# Patient Record
Sex: FEMALE | Race: WHITE | NOT HISPANIC OR LATINO | Employment: FULL TIME
[De-identification: names, ages, dates, MRNs, and addresses within clinical notes are randomized per-mention and may not be internally consistent; named-entity substitution may affect disease eponyms.]

---

## 2017-07-08 ENCOUNTER — HEALTH MAINTENANCE LETTER (OUTPATIENT)
Age: 37
End: 2017-07-08

## 2019-11-01 ENCOUNTER — OFFICE VISIT (OUTPATIENT)
Dept: URGENT CARE | Facility: URGENT CARE | Age: 39
End: 2019-11-01
Payer: COMMERCIAL

## 2019-11-01 VITALS
TEMPERATURE: 98.5 F | DIASTOLIC BLOOD PRESSURE: 76 MMHG | RESPIRATION RATE: 18 BRPM | SYSTOLIC BLOOD PRESSURE: 132 MMHG | OXYGEN SATURATION: 99 %

## 2019-11-01 DIAGNOSIS — M54.50 ACUTE LEFT-SIDED LOW BACK PAIN WITHOUT SCIATICA: Primary | ICD-10-CM

## 2019-11-01 PROCEDURE — 99203 OFFICE O/P NEW LOW 30 MIN: CPT | Performed by: PHYSICIAN ASSISTANT

## 2019-11-01 RX ORDER — CYCLOBENZAPRINE HCL 5 MG
5 TABLET ORAL 3 TIMES DAILY PRN
Qty: 21 TABLET | Refills: 0 | Status: SHIPPED | OUTPATIENT
Start: 2019-11-01

## 2019-11-02 NOTE — PROGRESS NOTES
"HPI:  Blaire is a 39 yo female who presents for left lower back pain x 2 days.  She has been involved in a weight lifting program x 7 weeks.  She first felt a twinge and back stiffness after a workout.  It seemed to be okay but then last night she bent down to pick something up and felt a \"pull\" and pain.  She used a heating pad over night and it feels better than yesterday but she sat all day at work today which seemed to aggravate it more.  Reports radiates into left leg sometimes to approx mid thigh, but denies numbness or tingling.  She has taken 2 advil for relief.       ROS:  See HPI      PE:  Vitals & nursing notes reviewed. B/P: 132/76, T: 98.5, P: Data Unavailable, R: 18  Constitutional:  Alert, well nourished, well-developed, NAD  MS:  TTP over left paraspinal muscles of lower back.  Reduced ROM on forward flexion, extension, and left lateral flexion.  No ecchymosis, edema or erythema appreciated  Calf strength:  5/5 BL  Tibialis Anterior strength:  5/5 BL  EHL strength:  5/5 BL  Leg Raise test:  Negative BL      ASSESSMENT:  (M54.5) Acute left-sided low back pain without sciatica  (primary encounter diagnosis)  Comment: muscular.  Plan: cyclobenzaprine (FLEXERIL) 5 MG tablet        ICE 20 minutes on, 1 hour off.  Ibuprofen 600 mg q 4-6 hrs PRN pain (take with food).  Discussed sedative effects of flexeril.  Rest back for 1-2 days then begin gentle back stretching exercises. (Demonstrated to patient & printout given).  If sx do not improve, may require PT.  FU with PCP if no improvement in 7 days, sooner if worsens.        "

## 2020-02-10 ENCOUNTER — HEALTH MAINTENANCE LETTER (OUTPATIENT)
Age: 40
End: 2020-02-10

## 2020-03-12 ENCOUNTER — OFFICE VISIT (OUTPATIENT)
Dept: URGENT CARE | Facility: URGENT CARE | Age: 40
End: 2020-03-12
Payer: COMMERCIAL

## 2020-03-12 VITALS
HEART RATE: 68 BPM | OXYGEN SATURATION: 98 % | DIASTOLIC BLOOD PRESSURE: 80 MMHG | SYSTOLIC BLOOD PRESSURE: 120 MMHG | BODY MASS INDEX: 31.58 KG/M2 | WEIGHT: 171 LBS | TEMPERATURE: 98.6 F

## 2020-03-12 DIAGNOSIS — L08.9 SKIN INFECTION: Primary | ICD-10-CM

## 2020-03-12 PROCEDURE — 99214 OFFICE O/P EST MOD 30 MIN: CPT | Performed by: FAMILY MEDICINE

## 2020-03-12 RX ORDER — SULFAMETHOXAZOLE/TRIMETHOPRIM 800-160 MG
1 TABLET ORAL 2 TIMES DAILY
Qty: 20 TABLET | Refills: 0 | Status: SHIPPED | OUTPATIENT
Start: 2020-03-12 | End: 2020-03-12

## 2020-03-12 RX ORDER — SULFAMETHOXAZOLE/TRIMETHOPRIM 800-160 MG
1 TABLET ORAL 2 TIMES DAILY
Qty: 20 TABLET | Refills: 0 | Status: SHIPPED | OUTPATIENT
Start: 2020-03-12

## 2020-03-12 ASSESSMENT — ENCOUNTER SYMPTOMS
WEAKNESS: 0
FEVER: 0
NUMBNESS: 0
COLOR CHANGE: 1
ARTHRALGIAS: 0
WOUND: 1

## 2020-03-12 NOTE — PROGRESS NOTES
Subjective     Blaire Luna is a 39 year old female who presents to clinic today for the following health issues:    HPI   Patient presents to urgent care with concerns of a left foot infection.  Started about 3 days ago, was kneeling down when she noted great something on the floor.  Subsequently the area has been more painful, red.  No numbness weakness or tingling.  No systemic symptoms such as fever.  Reportedly has a history of penicillin based allergies, she does not tolerate Keflex or amoxicillin.    Reviewed and updated as needed this visit by Provider         Review of Systems   Constitutional: Negative for fever.   Musculoskeletal: Negative for arthralgias.   Skin: Positive for color change and wound.   Neurological: Negative for weakness and numbness.            Objective    /80   Pulse 68   Temp 98.6  F (37  C) (Tympanic)   Wt 77.6 kg (171 lb)   SpO2 98%   BMI 31.58 kg/m    Body mass index is 31.58 kg/m .  Physical Exam  Constitutional:       Appearance: She is not ill-appearing.   Skin:     Comments: Left forefoot, superior aspect, 1 cm erythematous patch with a close central scab.  There is no fluctuance, or crepitus.  The area is tender to palpation.  Dorsalis pedis pulse 2+ and symmetric.             Assessment & Plan     1. Skin infection  Acute problem.  Localized cellulitis, does not tolerate penicillin-based or beta-lactam antibiotics.  Start Bactrim. Up to date; Tdap 2014.  Give anticipatory guidance that if not improved the next 24 to 48 hours or if the redness spreads, she should return for further evaluation.  - sulfamethoxazole-trimethoprim (BACTRIM DS) 800-160 MG tablet; Take 1 tablet by mouth 2 times daily  Dispense: 20 tablet; Refill: 0       Return in about 5 days (around 3/17/2020) for If symptoms do not improve or gets worse..    Jermaine Hardy MD  [facility]      Documentation was prepared using Dragon voice recognition software. Please excuse  typographical errors. Please contact me if documentation is unclear.

## 2020-03-12 NOTE — PATIENT INSTRUCTIONS
Patient Education     Cellulitis  Cellulitis is an infection of the deep layers of skin. A break in the skin, such as a cut or scratch, can let bacteria under the skin. If the bacteria get to deep layers of the skin, it can be serious. If not treated, cellulitis can get into the bloodstream and lymph nodes. The infection can then spread throughout the body. This causes serious illness.  Cellulitis causes the affected skin to become red, swollen, warm, and sore. The reddened areas have a visible border. An open sore may leak fluid (pus). You may have a fever, chills, and pain.  Cellulitis is treated with antibiotics taken for 7 to 10 days. An open sore may be cleaned and covered with cool wet gauze. Symptoms should get better 1 to 2 days after treatment is started. Make sure to take all the antibiotics for the full number of days until they are gone. Keep taking the medicine even if your symptoms go away.  Home care  Follow these tips:    Limit the use of the part of your body with cellulitis.     If the infection is on your leg, keep your leg raised while sitting. This will help to reduce swelling.    Take all of the antibiotic medicine exactly as directed until it is gone. Do not miss any doses, especially during the first 7 days. Don t stop taking the medicine when your symptoms get better.    Keep the affected area clean and dry.    Wash your hands with soap and warm water before and after touching your skin. Anyone else who touches your skin should also wash his or her hands. Don't share towels.  Follow-up care  Follow up with your healthcare provider, or as advised. If your infection does not go away on the first antibiotic, your healthcare provider will prescribe a different one.  When to seek medical advice  Call your healthcare provider right away if any of these occur:    Red areas that spread    Swelling or pain that gets worse    Fluid leaking from the skin (pus)    Fever higher of 100.4  F (38.0  C) or  higher after 2 days on antibiotics  Date Last Reviewed: 9/1/2016 2000-2019 [employer]. [address] [place] [address], [place] [address]. All rights reserved. This information is not intended as a substitute for professional medical care. Always follow your healthcare professional's instructions.

## 2020-09-14 ENCOUNTER — HOSPITAL ENCOUNTER (OUTPATIENT)
Dept: MAMMOGRAPHY | Facility: CLINIC | Age: 40
Discharge: HOME | End: 2020-09-14
Attending: PHYSICIAN ASSISTANT | Admitting: PHYSICIAN ASSISTANT
Payer: COMMERCIAL

## 2020-09-14 DIAGNOSIS — Z12.31 VISIT FOR SCREENING MAMMOGRAM: ICD-10-CM

## 2020-09-14 DIAGNOSIS — Z98.890 HISTORY OF REDUCTION SURGERY OF LEFT BREAST: ICD-10-CM

## 2020-09-14 PROCEDURE — 77063 BREAST TOMOSYNTHESIS BI: CPT

## 2020-11-14 ENCOUNTER — HEALTH MAINTENANCE LETTER (OUTPATIENT)
Age: 40
End: 2020-11-14

## 2021-03-28 ENCOUNTER — HEALTH MAINTENANCE LETTER (OUTPATIENT)
Age: 41
End: 2021-03-28

## 2021-04-07 ENCOUNTER — IMMUNIZATION (OUTPATIENT)
Dept: NURSING | Facility: CLINIC | Age: 41
End: 2021-04-07
Payer: COMMERCIAL

## 2021-04-07 PROCEDURE — 91300 PR COVID VAC PFIZER DIL RECON 30 MCG/0.3 ML IM: CPT

## 2021-04-07 PROCEDURE — 0001A PR COVID VAC PFIZER DIL RECON 30 MCG/0.3 ML IM: CPT

## 2021-04-28 ENCOUNTER — IMMUNIZATION (OUTPATIENT)
Dept: NURSING | Facility: CLINIC | Age: 41
End: 2021-04-28
Attending: INTERNAL MEDICINE
Payer: COMMERCIAL

## 2021-04-28 PROCEDURE — 0002A PR COVID VAC PFIZER DIL RECON 30 MCG/0.3 ML IM: CPT

## 2021-04-28 PROCEDURE — 91300 PR COVID VAC PFIZER DIL RECON 30 MCG/0.3 ML IM: CPT

## 2021-09-12 ENCOUNTER — HEALTH MAINTENANCE LETTER (OUTPATIENT)
Age: 41
End: 2021-09-12

## 2022-04-24 ENCOUNTER — HEALTH MAINTENANCE LETTER (OUTPATIENT)
Age: 42
End: 2022-04-24

## 2022-11-19 ENCOUNTER — HEALTH MAINTENANCE LETTER (OUTPATIENT)
Age: 42
End: 2022-11-19

## 2023-06-01 ENCOUNTER — HEALTH MAINTENANCE LETTER (OUTPATIENT)
Age: 43
End: 2023-06-01

## 2024-04-06 ENCOUNTER — HEALTH MAINTENANCE LETTER (OUTPATIENT)
Age: 44
End: 2024-04-06

## 2024-06-15 ENCOUNTER — HEALTH MAINTENANCE LETTER (OUTPATIENT)
Age: 44
End: 2024-06-15